# Patient Record
Sex: MALE | URBAN - METROPOLITAN AREA
[De-identification: names, ages, dates, MRNs, and addresses within clinical notes are randomized per-mention and may not be internally consistent; named-entity substitution may affect disease eponyms.]

---

## 2017-05-06 ENCOUNTER — EMERGENCY (EMERGENCY)
Facility: HOSPITAL | Age: 32
LOS: 1 days | Discharge: HOME | End: 2017-05-06
Attending: EMERGENCY MEDICINE | Admitting: EMERGENCY MEDICINE
Payer: SELF-PAY

## 2017-05-06 VITALS
TEMPERATURE: 97 F | WEIGHT: 169.98 LBS | OXYGEN SATURATION: 100 % | DIASTOLIC BLOOD PRESSURE: 73 MMHG | SYSTOLIC BLOOD PRESSURE: 111 MMHG | HEIGHT: 67 IN | HEART RATE: 67 BPM | RESPIRATION RATE: 18 BRPM

## 2017-05-06 DIAGNOSIS — R53.1 WEAKNESS: ICD-10-CM

## 2017-05-06 DIAGNOSIS — Z86.69 PERSONAL HISTORY OF OTHER DISEASES OF THE NERVOUS SYSTEM AND SENSE ORGANS: ICD-10-CM

## 2017-05-06 DIAGNOSIS — M62.82 RHABDOMYOLYSIS: ICD-10-CM

## 2017-05-06 DIAGNOSIS — F17.210 NICOTINE DEPENDENCE, CIGARETTES, UNCOMPLICATED: ICD-10-CM

## 2017-05-06 LAB
AMPHET UR-MCNC: POSITIVE
APPEARANCE UR: CLEAR — SIGNIFICANT CHANGE UP
BARBITURATES UR SCN-MCNC: NEGATIVE — SIGNIFICANT CHANGE UP
BENZODIAZ UR-MCNC: NEGATIVE — SIGNIFICANT CHANGE UP
BILIRUB UR-MCNC: NEGATIVE — SIGNIFICANT CHANGE UP
COCAINE METAB.OTHER UR-MCNC: NEGATIVE — SIGNIFICANT CHANGE UP
COLOR SPEC: YELLOW — SIGNIFICANT CHANGE UP
DIFF PNL FLD: NEGATIVE — SIGNIFICANT CHANGE UP
GLUCOSE UR QL: NEGATIVE MG/DL — SIGNIFICANT CHANGE UP
KETONES UR-MCNC: NEGATIVE — SIGNIFICANT CHANGE UP
LEUKOCYTE ESTERASE UR-ACNC: NEGATIVE — SIGNIFICANT CHANGE UP
METHADONE UR-MCNC: NEGATIVE — SIGNIFICANT CHANGE UP
NITRITE UR-MCNC: NEGATIVE — SIGNIFICANT CHANGE UP
OPIATES UR-MCNC: NEGATIVE — SIGNIFICANT CHANGE UP
PCP SPEC-MCNC: SIGNIFICANT CHANGE UP
PCP UR-MCNC: NEGATIVE — SIGNIFICANT CHANGE UP
PH UR: 5 — SIGNIFICANT CHANGE UP (ref 5–8)
PROT UR-MCNC: NEGATIVE MG/DL — SIGNIFICANT CHANGE UP
SP GR SPEC: 1.02 — SIGNIFICANT CHANGE UP (ref 1.01–1.02)
THC UR QL: NEGATIVE — SIGNIFICANT CHANGE UP
UROBILINOGEN FLD QL: NEGATIVE MG/DL — SIGNIFICANT CHANGE UP

## 2017-05-06 PROCEDURE — 93010 ELECTROCARDIOGRAM REPORT: CPT

## 2017-05-06 PROCEDURE — 99285 EMERGENCY DEPT VISIT HI MDM: CPT

## 2017-05-06 RX ORDER — SODIUM CHLORIDE 9 MG/ML
2000 INJECTION INTRAMUSCULAR; INTRAVENOUS; SUBCUTANEOUS ONCE
Qty: 0 | Refills: 0 | Status: COMPLETED | OUTPATIENT
Start: 2017-05-06 | End: 2017-05-06

## 2017-05-06 RX ADMIN — SODIUM CHLORIDE 1000 MILLILITER(S): 9 INJECTION INTRAMUSCULAR; INTRAVENOUS; SUBCUTANEOUS at 23:28

## 2017-05-06 NOTE — ED PROVIDER NOTE - PHYSICAL EXAMINATION
Constitutional :Sitting comfortably, talking in full sentences  Head :NC AT , no swelling  Eyes :eomi, no swelling  Mouth :mm moist, poor dentition  Neck : supple, trachea in midline  Chest :Albino air entry, symm chest expansion, no distress  Heart :S1 S2 distant  Abdomin :abd soft, non tender,   no groin swelling  Musc/Skel :ext no swelling, no deformity, no spine tenderness, distal pulses present  Neuro: AAO*3, follows commands  motor albino upper and lower ext 5/5  sensory symm and intact  CN 2-12 grossly intact  no ataxia, no nystagmus  finger to nose, symm albino, no pronator drift  Psych: no HI, no SI, no auditory or visual hallucinations Constitutional :Sitting comfortably, talking in full sentences  Head :NC AT , no swelling  Eyes :eomi, no swelling  Mouth :mm moist,   Neck : supple, trachea in midline  Chest :Albino air entry, symm chest expansion, no distress  Heart :S1 S2 distant  Abdomin :abd soft, non tender,   Musc/Skel :ext no swelling, no deformity, no spine tenderness, distal pulses present  Neuro: AAO*3, follows commands  motor albino upper and lower ext 5/5  sensory symm and intact  CN 2-12 grossly intact  no ataxia, no nystagmus  finger to nose, symm albino, no pronator drift  Psych: normal affect. Laughing. Answers

## 2017-05-06 NOTE — ED PROVIDER NOTE - DETAILS:
I, MARIBELL Pham MD, personally performed the services described in the documentation, reviewed the documentation recorded by the scribe in my presence and it accurately and completely records my words and action

## 2017-05-06 NOTE — ED ADULT TRIAGE NOTE - CHIEF COMPLAINT QUOTE
pt states he was in [facility] about a wk ago with rhabdo he states still feels bad, weak and not himself, pt c/o pschy issues states not feeling well did not take depakot for the last wk, denies any SI, hx bipolar pt states he was in [facility] about a wk ago with rhabdo he states still feels bad, weak and not himself, pt c/o pschy issues states not feeling well did not take depakot for the last wk, mom states he is using street drugs, at this time pt seems restless, anxious denies any SI, hx bipolar, schizophrenia

## 2017-05-06 NOTE — ED PROVIDER NOTE - NS ED ROS FT
no weight change, no fever or chills  no rash, no bruises  no visual changes no discharge  no cough cold or congestion,   no sob, no chest pain  no orthopnea, no pnd  no abd pain, no n/v/d  no hematuria, no change in urinary habits  no headache, no paresthesia  no previous psych evaluation no weight change, no fever or chills  no rash, no bruises  no visual changes no discharge  no cough cold or congestion,   no sob, no chest pain  Denies SI/HI, visual/auditory hallucinations.   no orthopnea, no pnd  no abd pain, no n/v/d  no hematuria, no change in urinary habits  no headache, no paresthesia  (+) weakness

## 2017-05-06 NOTE — ED PROVIDER NOTE - MEDICAL DECISION MAKING DETAILS
33 y/o male pt w/ poly substance abuse and over the counter supplements is a repeat visitor for weakness. Will check CPK, fluids and re-evaluate.

## 2017-05-06 NOTE — ED ADULT NURSE NOTE - OBJECTIVE STATEMENT
Pt a&ox3 c/o [id number], reports generalized weakness and feeling tired frequently. Denies CP denies SOB denies N/V/D denies fevers. 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 Pt with psych hx, denies SI/HI, denies illicit drug use. 1:1 in place per MD orders. Pt received in yellow gown with mother @ bedside. MD Pham @ bedside for assessment. MAEx4, ambulated to restroom with steady gait noted. Resp even and unlabored, lungs CTA jocelynn. Will continue to monitor and reassess Pt a&ox3 c/o [id number], reports generalized weakness and feeling tired frequently. Denies CP denies SOB denies N/V/D denies fevers. 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 Pt with psych hx, denies SI/HI, denies illicit drug use. Pt states "Im not taking any medications right now", 1:1 in place per MD orders. Pt received in yellow gown with mother @ bedside. MD Pham @ bedside for assessment. MAEx4, ambulated to restroom with steady gait noted. Resp even and unlabored, lungs CTA jocelynn. Will continue to monitor and reassess

## 2017-05-06 NOTE — ED PROVIDER NOTE - NS ED MD SCRIBE ATTENDING SCRIBE SECTIONS
HISTORY OF PRESENT ILLNESS/DISPOSITION/HIV/REVIEW OF SYSTEMS/PHYSICAL EXAM/INTAKE ASSESSMENT/SCREENINGS/VITAL SIGNS( Pullset)/PROGRESS NOTE/RESULTS/CONSULTATIONS/SHIFT CHANGE/PAST MEDICAL/SURGICAL/SOCIAL HISTORY

## 2017-05-06 NOTE — ED ADULT NURSE NOTE - CHIEF COMPLAINT QUOTE
pt states he was in [facility] about a wk ago with rhabdo he states still feels bad, weak and not himself, pt c/o pschy issues states not feeling well did not take depakot for the last wk, mom states he is using street drugs, at this time pt seems restless, anxious denies any SI, hx bipolar, schizophrenia

## 2017-05-06 NOTE — ED PROVIDER NOTE - PROGRESS NOTE DETAILS
labs reviewed, and repeated  follow up addressed, brought supplements to show  reviewed, eductaed, mother left, did not want to stay

## 2017-05-07 VITALS
RESPIRATION RATE: 16 BRPM | DIASTOLIC BLOOD PRESSURE: 66 MMHG | SYSTOLIC BLOOD PRESSURE: 112 MMHG | OXYGEN SATURATION: 96 % | HEART RATE: 52 BPM | TEMPERATURE: 98 F

## 2017-05-07 LAB
ALBUMIN SERPL ELPH-MCNC: 3.9 G/DL — SIGNIFICANT CHANGE UP (ref 3.3–5.2)
ALP SERPL-CCNC: 58 U/L — SIGNIFICANT CHANGE UP (ref 40–120)
ALT FLD-CCNC: 31 U/L — SIGNIFICANT CHANGE UP
ANION GAP SERPL CALC-SCNC: 11 MMOL/L — SIGNIFICANT CHANGE UP (ref 5–17)
APAP SERPL-MCNC: <7.5 UG/ML — LOW (ref 10–26)
AST SERPL-CCNC: 44 U/L — HIGH
BILIRUB SERPL-MCNC: 0.1 MG/DL — LOW (ref 0.4–2)
BUN SERPL-MCNC: 17 MG/DL — SIGNIFICANT CHANGE UP (ref 8–20)
CALCIUM SERPL-MCNC: 9.1 MG/DL — SIGNIFICANT CHANGE UP (ref 8.6–10.2)
CHLORIDE SERPL-SCNC: 100 MMOL/L — SIGNIFICANT CHANGE UP (ref 98–107)
CK MB CFR SERPL CALC: 10.9 NG/ML — HIGH (ref 0–6.7)
CK MB CFR SERPL CALC: 12.8 NG/ML — HIGH (ref 0–6.7)
CK SERPL-CCNC: 1228 U/L — HIGH (ref 30–200)
CK SERPL-CCNC: 1417 U/L — HIGH (ref 30–200)
CO2 SERPL-SCNC: 27 MMOL/L — SIGNIFICANT CHANGE UP (ref 22–29)
CREAT SERPL-MCNC: 0.82 MG/DL — SIGNIFICANT CHANGE UP (ref 0.5–1.3)
ETHANOL SERPL-MCNC: <10 MG/DL — SIGNIFICANT CHANGE UP
GLUCOSE SERPL-MCNC: 104 MG/DL — SIGNIFICANT CHANGE UP (ref 70–115)
POTASSIUM SERPL-MCNC: 4.4 MMOL/L — SIGNIFICANT CHANGE UP (ref 3.5–5.3)
POTASSIUM SERPL-SCNC: 4.4 MMOL/L — SIGNIFICANT CHANGE UP (ref 3.5–5.3)
PROT SERPL-MCNC: 6.8 G/DL — SIGNIFICANT CHANGE UP (ref 6.6–8.7)
SALICYLATES SERPL-MCNC: <2 MG/DL — LOW (ref 10–20)
SODIUM SERPL-SCNC: 138 MMOL/L — SIGNIFICANT CHANGE UP (ref 135–145)

## 2017-05-07 PROCEDURE — 81003 URINALYSIS AUTO W/O SCOPE: CPT

## 2017-05-07 PROCEDURE — 82553 CREATINE MB FRACTION: CPT

## 2017-05-07 PROCEDURE — 93005 ELECTROCARDIOGRAM TRACING: CPT

## 2017-05-07 PROCEDURE — 84100 ASSAY OF PHOSPHORUS: CPT

## 2017-05-07 PROCEDURE — 99284 EMERGENCY DEPT VISIT MOD MDM: CPT | Mod: 25

## 2017-05-07 PROCEDURE — 83735 ASSAY OF MAGNESIUM: CPT

## 2017-05-07 PROCEDURE — 82550 ASSAY OF CK (CPK): CPT

## 2017-05-07 PROCEDURE — 80307 DRUG TEST PRSMV CHEM ANLYZR: CPT

## 2017-05-07 PROCEDURE — 80053 COMPREHEN METABOLIC PANEL: CPT

## 2017-05-07 RX ORDER — SODIUM CHLORIDE 9 MG/ML
999 INJECTION, SOLUTION INTRAVENOUS ONCE
Qty: 0 | Refills: 0 | Status: COMPLETED | OUTPATIENT
Start: 2017-05-07 | End: 2017-05-07

## 2017-05-07 RX ORDER — SODIUM CHLORIDE 9 MG/ML
1000 INJECTION INTRAMUSCULAR; INTRAVENOUS; SUBCUTANEOUS
Qty: 0 | Refills: 0 | Status: DISCONTINUED | OUTPATIENT
Start: 2017-05-07 | End: 2017-05-07

## 2017-05-07 RX ADMIN — SODIUM CHLORIDE 999 MILLILITER(S): 9 INJECTION, SOLUTION INTRAVENOUS at 04:00

## 2017-05-07 NOTE — ED ADULT NURSE REASSESSMENT NOTE - NS ED NURSE REASSESS COMMENT FT1
2x IVF bolus infusing @ this time, family @ bedside, updated on POC, pt in no apparent distress. Crumrod provided

## 2017-05-07 NOTE — ED ADULT NURSE REASSESSMENT NOTE - NS ED NURSE REASSESS COMMENT FT1
LR bolus infusing, pt asleep on stretcher, easily awoken by RN, pt in no apparent distress. Will continue to monitor and reassess

## 2017-08-27 ENCOUNTER — EMERGENCY (EMERGENCY)
Facility: HOSPITAL | Age: 32
LOS: 1 days | Discharge: HOME | End: 2017-08-27
Attending: EMERGENCY MEDICINE | Admitting: EMERGENCY MEDICINE
Payer: MEDICAID

## 2017-08-27 VITALS
SYSTOLIC BLOOD PRESSURE: 121 MMHG | WEIGHT: 199.96 LBS | HEART RATE: 93 BPM | OXYGEN SATURATION: 100 % | HEIGHT: 68 IN | RESPIRATION RATE: 16 BRPM | TEMPERATURE: 99 F | DIASTOLIC BLOOD PRESSURE: 67 MMHG

## 2017-08-27 VITALS
RESPIRATION RATE: 16 BRPM | SYSTOLIC BLOOD PRESSURE: 145 MMHG | OXYGEN SATURATION: 100 % | HEART RATE: 64 BPM | DIASTOLIC BLOOD PRESSURE: 73 MMHG

## 2017-08-27 LAB
ALBUMIN SERPL ELPH-MCNC: 4.7 G/DL — SIGNIFICANT CHANGE UP (ref 3.3–5.2)
ALP SERPL-CCNC: 54 U/L — SIGNIFICANT CHANGE UP (ref 40–120)
ALT FLD-CCNC: 20 U/L — SIGNIFICANT CHANGE UP
AMPHET UR-MCNC: NEGATIVE — SIGNIFICANT CHANGE UP
ANION GAP SERPL CALC-SCNC: 16 MMOL/L — SIGNIFICANT CHANGE UP (ref 5–17)
AST SERPL-CCNC: 29 U/L — SIGNIFICANT CHANGE UP
BARBITURATES UR SCN-MCNC: NEGATIVE — SIGNIFICANT CHANGE UP
BASOPHILS # BLD AUTO: 0 K/UL — SIGNIFICANT CHANGE UP (ref 0–0.2)
BASOPHILS NFR BLD AUTO: 0.2 % — SIGNIFICANT CHANGE UP (ref 0–2)
BENZODIAZ UR-MCNC: NEGATIVE — SIGNIFICANT CHANGE UP
BILIRUB SERPL-MCNC: 0.5 MG/DL — SIGNIFICANT CHANGE UP (ref 0.4–2)
BUN SERPL-MCNC: 13 MG/DL — SIGNIFICANT CHANGE UP (ref 8–20)
CALCIUM SERPL-MCNC: 10 MG/DL — SIGNIFICANT CHANGE UP (ref 8.6–10.2)
CHLORIDE SERPL-SCNC: 101 MMOL/L — SIGNIFICANT CHANGE UP (ref 98–107)
CK MB CFR SERPL CALC: 5.6 NG/ML — SIGNIFICANT CHANGE UP (ref 0–6.7)
CK SERPL-CCNC: 637 U/L — HIGH (ref 30–200)
CO2 SERPL-SCNC: 24 MMOL/L — SIGNIFICANT CHANGE UP (ref 22–29)
COCAINE METAB.OTHER UR-MCNC: POSITIVE
CREAT SERPL-MCNC: 0.85 MG/DL — SIGNIFICANT CHANGE UP (ref 0.5–1.3)
EOSINOPHIL # BLD AUTO: 0 K/UL — SIGNIFICANT CHANGE UP (ref 0–0.5)
EOSINOPHIL NFR BLD AUTO: 0.1 % — SIGNIFICANT CHANGE UP (ref 0–5)
ETHANOL SERPL-MCNC: <10 MG/DL — SIGNIFICANT CHANGE UP
GLUCOSE SERPL-MCNC: 119 MG/DL — HIGH (ref 70–115)
HCT VFR BLD CALC: 40 % — LOW (ref 42–52)
HGB BLD-MCNC: 14 G/DL — SIGNIFICANT CHANGE UP (ref 14–18)
HIV 1 & 2 AB SERPL IA.RAPID: SIGNIFICANT CHANGE UP
LIDOCAIN IGE QN: 14 U/L — LOW (ref 22–51)
LYMPHOCYTES # BLD AUTO: 2.4 K/UL — SIGNIFICANT CHANGE UP (ref 1–4.8)
LYMPHOCYTES # BLD AUTO: 27.4 % — SIGNIFICANT CHANGE UP (ref 20–55)
MCHC RBC-ENTMCNC: 31.1 PG — HIGH (ref 27–31)
MCHC RBC-ENTMCNC: 35 G/DL — SIGNIFICANT CHANGE UP (ref 32–36)
MCV RBC AUTO: 88.9 FL — SIGNIFICANT CHANGE UP (ref 80–94)
METHADONE UR-MCNC: NEGATIVE — SIGNIFICANT CHANGE UP
MONOCYTES # BLD AUTO: 0.8 K/UL — SIGNIFICANT CHANGE UP (ref 0–0.8)
MONOCYTES NFR BLD AUTO: 9.3 % — SIGNIFICANT CHANGE UP (ref 3–10)
NEUTROPHILS # BLD AUTO: 5.4 K/UL — SIGNIFICANT CHANGE UP (ref 1.8–8)
NEUTROPHILS NFR BLD AUTO: 62.7 % — SIGNIFICANT CHANGE UP (ref 37–73)
OPIATES UR-MCNC: NEGATIVE — SIGNIFICANT CHANGE UP
PCP SPEC-MCNC: SIGNIFICANT CHANGE UP
PCP UR-MCNC: NEGATIVE — SIGNIFICANT CHANGE UP
PLATELET # BLD AUTO: 323 K/UL — SIGNIFICANT CHANGE UP (ref 150–400)
POTASSIUM SERPL-MCNC: 3.8 MMOL/L — SIGNIFICANT CHANGE UP (ref 3.5–5.3)
POTASSIUM SERPL-SCNC: 3.8 MMOL/L — SIGNIFICANT CHANGE UP (ref 3.5–5.3)
PROT SERPL-MCNC: 8 G/DL — SIGNIFICANT CHANGE UP (ref 6.6–8.7)
RBC # BLD: 4.5 M/UL — LOW (ref 4.6–6.2)
RBC # FLD: 13.3 % — SIGNIFICANT CHANGE UP (ref 11–15.6)
SODIUM SERPL-SCNC: 141 MMOL/L — SIGNIFICANT CHANGE UP (ref 135–145)
THC UR QL: NEGATIVE — SIGNIFICANT CHANGE UP
TROPONIN T SERPL-MCNC: <0.01 NG/ML — SIGNIFICANT CHANGE UP (ref 0–0.06)
WBC # BLD: 8.7 K/UL — SIGNIFICANT CHANGE UP (ref 4.8–10.8)
WBC # FLD AUTO: 8.7 K/UL — SIGNIFICANT CHANGE UP (ref 4.8–10.8)

## 2017-08-27 PROCEDURE — 82553 CREATINE MB FRACTION: CPT

## 2017-08-27 PROCEDURE — 84484 ASSAY OF TROPONIN QUANT: CPT

## 2017-08-27 PROCEDURE — 86703 HIV-1/HIV-2 1 RESULT ANTBDY: CPT

## 2017-08-27 PROCEDURE — 80053 COMPREHEN METABOLIC PANEL: CPT

## 2017-08-27 PROCEDURE — 80307 DRUG TEST PRSMV CHEM ANLYZR: CPT

## 2017-08-27 PROCEDURE — 96372 THER/PROPH/DIAG INJ SC/IM: CPT | Mod: XU

## 2017-08-27 PROCEDURE — 82550 ASSAY OF CK (CPK): CPT

## 2017-08-27 PROCEDURE — 83690 ASSAY OF LIPASE: CPT

## 2017-08-27 PROCEDURE — 99284 EMERGENCY DEPT VISIT MOD MDM: CPT | Mod: 25

## 2017-08-27 PROCEDURE — 99285 EMERGENCY DEPT VISIT HI MDM: CPT

## 2017-08-27 PROCEDURE — 85027 COMPLETE CBC AUTOMATED: CPT

## 2017-08-27 PROCEDURE — 36415 COLL VENOUS BLD VENIPUNCTURE: CPT

## 2017-08-27 PROCEDURE — 96374 THER/PROPH/DIAG INJ IV PUSH: CPT

## 2017-08-27 RX ORDER — AZITHROMYCIN 500 MG/1
1000 TABLET, FILM COATED ORAL ONCE
Qty: 0 | Refills: 0 | Status: COMPLETED | OUTPATIENT
Start: 2017-08-27 | End: 2017-08-27

## 2017-08-27 RX ORDER — CEFTRIAXONE 500 MG/1
250 INJECTION, POWDER, FOR SOLUTION INTRAMUSCULAR; INTRAVENOUS ONCE
Qty: 0 | Refills: 0 | Status: COMPLETED | OUTPATIENT
Start: 2017-08-27 | End: 2017-08-27

## 2017-08-27 RX ADMIN — CEFTRIAXONE 250 MILLIGRAM(S): 500 INJECTION, POWDER, FOR SOLUTION INTRAMUSCULAR; INTRAVENOUS at 13:33

## 2017-08-27 RX ADMIN — AZITHROMYCIN 1000 MILLIGRAM(S): 500 TABLET, FILM COATED ORAL at 13:33

## 2017-08-27 RX ADMIN — Medication 2 MILLIGRAM(S): at 10:57

## 2017-08-27 NOTE — ED ADULT NURSE NOTE - OBJECTIVE STATEMENT
32 year old male alert and oriented x 4, strange affect, inappropriate speech and behavior, complains of left sided abdominal pain x one month, reports nausea, denies vomiting, denies diarrhea and states "I haven't been going to the bathroom". Pt admits to drinking, using cocaine and taking ecstasy, unknown amounts. Pt admits to history of bipolar and does not take medications. Pt denies suicidal ideation, pt denies homicidal ideation. Pt pacing floor, fidgeting. Stepfather at bedside states "I'm here because his mother does not want him tied down. He recently got out of a substance abuse program for abusing Mollies". Pt cooperative and able to be redirected to bed as needed but repeatedly getting up, pacing floor, clenching fists. Charge RN made aware pt needs private room and one to one. Dr Angulo made aware of pt presentation, pt in yellow gown with non skid socks and belongings removed, will monitor and maintain safety of pt and staff.

## 2017-08-27 NOTE — ED ADULT NURSE REASSESSMENT NOTE - NS ED NURSE REASSESS COMMENT FT1
Pt medicated with Ativan 2mg IV with verbal order from Dr Angulo.
TAYLOR Gilmore and  made aware pt in need of well trained male one to one for the safety of others. Pt pacing, stating "I need to have sex now". Pt asking RN "Can you help me? I need cialis".  at bedside, Dr Angulo at bedside, stepfather at bedside.
Erma Rutledge telephone # [phone] or [phone] is leaving to go home and asks to be called when pt is ready to go home. Erma requesting pt get another dose of Ativan for patient to assist pt in calming down. Erma reports pt has job and has to go to work tomorrow and only behaves this way when he does drugs. Pt remains on one to one for safety at all times. Dr Angulo was made aware and will put in orders.
Pt continued to act inappropriately, requesting Rx for Cialis, pacing, etc. Dr Angulo made aware of possible unsafe discharge and ordered Psych eval. Pt seen by Psych NP Genesis and await her written report. Charge RN Zayda Bruce was made aware of pt pending discharge and will speak to TAYLOR Gilmore regarding same.
Pt cooperative during blood draw but continues with inappropriate behavior, rubbing RN leg with his foot, asking for RX for Cialis. Staff and pt safety maintained, one to one Anders at bedside during blood draw and now relieved by GARIMA Santos who will remain at pt side at all times.

## 2017-08-27 NOTE — ED BEHAVIORAL HEALTH NOTE - BEHAVIORAL HEALTH NOTE
41 YO AA male brought to the ED with his stepfather after drinking alcohol, using cocaine and Ecstasy. Events somewhat unclear as to what prompted the need for an emergency room visit. Pt ruminated to nursing staff about wanting to be tested for an STD following unprotected sex; he also requested a CT scan of abdomen for transient stomach pain. RN had expressed concerns of hypersexuality to Dr. Angulo as he was rubbing his foot on her leg as she gloria patient's blood; he also told her several times how he wanted to have sex. Pt appeared to still be under the effects of Ecstasy and denies any self harm thoughts, ideation or intent nor homicidal as well. His stepfather, Aamir Abbott [phone], stated that he would stay with him until the drug wears off. There is no clinical reason why patient should remain in the hospital; Dr. Angulo appraised and plans to discharge patient.

## 2017-08-27 NOTE — ED PROVIDER NOTE - GENITOURINARY BLADDER
NO gross penile lesions or discharge seen - patient exposed himself and no manual examination of testicles performed.

## 2017-08-27 NOTE — ED ADULT TRIAGE NOTE - CHIEF COMPLAINT QUOTE
pt comes to ed from home not wearing shirt for left abdominal pain and pain in penis. patient appears to be under influence of substance; states he drank alcohol today-unable to state how much. also admits to using ecstasy and cocaine. patient stepfather at bedside. pt comes to ed from home not wearing shirt for left abdominal pain and pain in penis. patient appears to be under influence of substance; states he drank alcohol today-unable to state how much. also admits to using ecstasy and cocaine. patient stepfather at bedside. pt unable to sit still in triage; cursing; fidgeting; direct to bed.

## 2017-08-27 NOTE — ED ADULT NURSE NOTE - CHIEF COMPLAINT QUOTE
pt comes to ed from home not wearing shirt for left abdominal pain and pain in penis. patient appears to be under influence of substance; states he drank alcohol today-unable to state how much. also admits to using ecstasy and cocaine. patient stepfather at bedside. pt unable to sit still in triage; cursing; fidgeting; direct to bed.

## 2017-08-27 NOTE — ED PROVIDER NOTE - PROGRESS NOTE DETAILS
Father came to me expressing concern about son's agitation and stated that he didn't want him to stay longer in the ER for concern that he may need sedation.  He states that son has had CTscans of his abdomen in the past and they never found any problems.  I discussed that I can't say anything about the pain or treat him with anything without further information obtained via CT scan and father said he would prefer to bring him to a gastroenterologist.  I also offered psychiatry evaluation in the ER and father states that at baseline he is highly functioning, works every day, but becomes " like this" whenever he is under the influence of substances.  He prefers to bring him home and will have him f/u with GI.  He says he will have him follow up with psychiatry outpatient. Pt seen by psychiatry NP  who felt that pt was likely coming off of substances and didn't appaer to be a harm to himself or anyone else. States that father said he will stay with him and ensure outpt f/u
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
## 2017-08-27 NOTE — ED PROVIDER NOTE - PHYSICAL EXAMINATION
Tangential speech/ Intermittently pacing around room  Pt intermittent agitated but able to be redirected.

## 2017-08-28 LAB
C TRACH RRNA SPEC QL NAA+PROBE: SIGNIFICANT CHANGE UP
N GONORRHOEA RRNA SPEC QL NAA+PROBE: SIGNIFICANT CHANGE UP
SPECIMEN SOURCE: SIGNIFICANT CHANGE UP

## 2017-10-13 ENCOUNTER — TRANSCRIPTION ENCOUNTER (OUTPATIENT)
Age: 32
End: 2017-10-13

## 2018-03-01 ENCOUNTER — OUTPATIENT (OUTPATIENT)
Dept: OUTPATIENT SERVICES | Facility: HOSPITAL | Age: 33
LOS: 1 days | End: 2018-03-01
Payer: MEDICAID

## 2018-03-01 PROCEDURE — G9001: CPT

## 2018-03-29 DIAGNOSIS — R69 ILLNESS, UNSPECIFIED: ICD-10-CM

## 2018-08-01 ENCOUNTER — OUTPATIENT (OUTPATIENT)
Dept: OUTPATIENT SERVICES | Facility: HOSPITAL | Age: 33
LOS: 1 days | End: 2018-08-01
Payer: MEDICAID

## 2018-08-07 DIAGNOSIS — Z71.89 OTHER SPECIFIED COUNSELING: ICD-10-CM

## 2018-08-07 PROBLEM — F31.9 BIPOLAR DISORDER, UNSPECIFIED: Chronic | Status: ACTIVE | Noted: 2017-05-06

## 2018-08-07 PROBLEM — F20.9 SCHIZOPHRENIA, UNSPECIFIED: Chronic | Status: ACTIVE | Noted: 2017-05-06

## 2019-03-01 PROCEDURE — G9005: CPT

## 2019-10-24 NOTE — ED ADULT TRIAGE NOTE - AS HEIGHT TYPE
Patient seen at the bedside.  He is known to me from rounding yesterday.  He is here on a legal hold.  There have been no events under my care.  Patient is eating, walking around the room as he does.  Does demonstrate paranoid behaviors.  He is not requiring any intervention.  Patient care will be signed out to oncoming ERP.   stated

## 2020-04-16 NOTE — ED ADULT NURSE NOTE - HARM RISK FACTORS
2100 Incontinent of bowel marcello care given, filey cath in place draining yellow urine. Dressing to right hip dry and clean, CMS intact pedal pulses palpable. no

## 2021-12-26 NOTE — ED ADULT NURSE NOTE - GENITOURINARY ASSESSMENT
Problem: Adult Inpatient Plan of Care  Goal: Plan of Care Review  Outcome: Improving  On 2L oxygen per nasal cannula with sats in the low to mid 90s. Denies pain and shortness of breath at rest. Affect is flat. doesn't really talk and express concerns.   Doesn't use the call light. Bed alarms intact.   Shortness of breath observed with ambulation.   Attended to.      - - -

## 2022-06-03 NOTE — ED PROVIDER NOTE - ABDOMINAL TENDER
left lower quadrant to deep palpation - no guarding or rebound.  Normal BS>  No lesions./left lower quadrant Opioid Pregnancy And Lactation Text: These medications can lead to premature delivery and should be avoided during pregnancy. These medications are also present in breast milk in small amounts.

## 2022-06-15 NOTE — ED ADULT TRIAGE NOTE - SOURCE OF INFORMATION
Patient Quality 130: Documentation Of Current Medications In The Medical Record: Current Medications Documented Detail Level: Detailed Quality 110: Preventive Care And Screening: Influenza Immunization: Influenza Immunization Administered during Influenza season Quality 226: Preventive Care And Screening: Tobacco Use: Screening And Cessation Intervention: Patient screened for tobacco use and is an ex/non-smoker Quality 128: Preventive Care And Screening: Body Mass Index (Bmi) Screening And Follow-Up Plan: BMI is documented within normal parameters and no follow-up plan is required.
